# Patient Record
Sex: MALE | NOT HISPANIC OR LATINO
[De-identification: names, ages, dates, MRNs, and addresses within clinical notes are randomized per-mention and may not be internally consistent; named-entity substitution may affect disease eponyms.]

---

## 2022-11-22 ENCOUNTER — APPOINTMENT (OUTPATIENT)
Dept: UROLOGY | Facility: CLINIC | Age: 62
End: 2022-11-22

## 2022-11-22 VITALS
DIASTOLIC BLOOD PRESSURE: 67 MMHG | OXYGEN SATURATION: 96 % | BODY MASS INDEX: 29.82 KG/M2 | HEART RATE: 75 BPM | SYSTOLIC BLOOD PRESSURE: 98 MMHG | HEIGHT: 65 IN | TEMPERATURE: 97.5 F | WEIGHT: 179 LBS

## 2022-11-22 DIAGNOSIS — Z78.9 OTHER SPECIFIED HEALTH STATUS: ICD-10-CM

## 2022-11-22 DIAGNOSIS — Z86.39 PERSONAL HISTORY OF OTHER ENDOCRINE, NUTRITIONAL AND METABOLIC DISEASE: ICD-10-CM

## 2022-11-22 PROBLEM — Z00.00 ENCOUNTER FOR PREVENTIVE HEALTH EXAMINATION: Status: ACTIVE | Noted: 2022-11-22

## 2022-11-22 PROCEDURE — 99203 OFFICE O/P NEW LOW 30 MIN: CPT

## 2022-11-22 RX ORDER — SIMVASTATIN 10 MG/1
10 TABLET, FILM COATED ORAL
Qty: 30 | Refills: 0 | Status: ACTIVE | COMMUNITY
Start: 2022-07-11

## 2022-11-22 RX ORDER — TAMSULOSIN HYDROCHLORIDE 0.4 MG/1
0.4 CAPSULE ORAL
Qty: 30 | Refills: 0 | Status: ACTIVE | COMMUNITY
Start: 2022-09-21

## 2022-11-22 NOTE — PHYSICAL EXAM
[General Appearance - Well Developed] : well developed [General Appearance - Well Nourished] : well nourished [Abdomen Soft] : soft [Abdomen Tenderness] : non-tender [Urethral Meatus] : meatus normal [Penis Abnormality] : normal circumcised penis [Rectal Exam - Rectum] : digital rectal exam was normal [Prostate Enlargement] : the prostate was not enlarged [Prostate Tenderness] : the prostate was not tender

## 2022-11-22 NOTE — ASSESSMENT
[FreeTextEntry1] : Mr. ACEVEDO was evaluated today for gross hematuria hematuria.  \par We discussed that in the large majority of cases, the source of hematuria is unknown but can also include prostatic bleeding, urinary tract stones, our other structural abnormalities. \par UA: small blood today\par \par Outside notes reviewed\par Outside PSA reviewed: 0.7, WNL\par \par Reports IPSS 13 (moderate bother). Mostly satisfied with QOL on tamsulosin\par \par Recommendations\par -CTU (BMP before)\par -UA + micro\par -Urine cytology\par -Cystoscopy \par \par Following this testing, we will formulate a plan moving forward.\par

## 2022-11-22 NOTE — HISTORY OF PRESENT ILLNESS
[FreeTextEntry1] : presents for initial evaluation of hematuria, BPH\par \par PMH significant for: HLD\par PSH significant for: nothing \par Significant meds: tamsulosin\par \par Reports 9 mo h/o painless gross hematuria\par Denies any pain, clots, retention\par Hx of tobacco use: No\par Hx of environmental exposures: No\par Hx of nephrolithiasis: No\par Personal Hx of hematuria previously: No\par Family Hx of  malignancy: No\par \par Denies incontinence \par Daytime Frequency: 4-5\par Nighttime Frequency: 1-2\par Pads per day: 0\par Straining to void: sometimes \par Hesitancy: Yes\par Intermittency: Yes\par Dribbling: Yes\par Subjective Incomplete Emptying: sometimes \par UTIs this past year: No\par \par Daily Fluid Total: 80 oz\par Daily Caffeine Total: 48 oz\par \par Neurologic Hx, Vision or Balance changes: No\par Erections: WNL\par Importance of maintaining ejaculatory ability: Very \par \par PSA (7/11/22): 0.4\par Prostate: 31 g on US ([facility])\par \par IPSS: 13, irritative>obstructive sxs\par QOL: mostly satisfied \par

## 2022-11-23 LAB
ANION GAP SERPL CALC-SCNC: 11 MMOL/L
BUN SERPL-MCNC: 15 MG/DL
CALCIUM SERPL-MCNC: 9.5 MG/DL
CHLORIDE SERPL-SCNC: 102 MMOL/L
CO2 SERPL-SCNC: 23 MMOL/L
CREAT SERPL-MCNC: 0.96 MG/DL
EGFR: 90 ML/MIN/1.73M2
GLUCOSE SERPL-MCNC: 83 MG/DL
POTASSIUM SERPL-SCNC: 4.6 MMOL/L
SODIUM SERPL-SCNC: 137 MMOL/L

## 2022-11-28 LAB
APPEARANCE: CLEAR
BACTERIA: NEGATIVE
BILIRUBIN URINE: NEGATIVE
BLOOD URINE: NEGATIVE
COLOR: NORMAL
GLUCOSE QUALITATIVE U: NEGATIVE
HYALINE CASTS: 1 /LPF
KETONES URINE: NEGATIVE
LEUKOCYTE ESTERASE URINE: NEGATIVE
MICROSCOPIC-UA: NORMAL
NITRITE URINE: NEGATIVE
PH URINE: 5.5
PROTEIN URINE: NEGATIVE
RED BLOOD CELLS URINE: 1 /HPF
SPECIFIC GRAVITY URINE: 1.03
SQUAMOUS EPITHELIAL CELLS: 0 /HPF
URINE CYTOLOGY: NORMAL
UROBILINOGEN URINE: NORMAL
WHITE BLOOD CELLS URINE: 0 /HPF

## 2022-12-13 ENCOUNTER — APPOINTMENT (OUTPATIENT)
Dept: UROLOGY | Facility: CLINIC | Age: 62
End: 2022-12-13

## 2022-12-13 PROCEDURE — 52000 CYSTOURETHROSCOPY: CPT

## 2022-12-13 PROCEDURE — 51741 ELECTRO-UROFLOWMETRY FIRST: CPT

## 2023-01-30 ENCOUNTER — APPOINTMENT (OUTPATIENT)
Dept: UROLOGY | Facility: CLINIC | Age: 63
End: 2023-01-30
Payer: COMMERCIAL

## 2023-01-30 DIAGNOSIS — Z87.438 PERSONAL HISTORY OF OTHER DISEASES OF MALE GENITAL ORGANS: ICD-10-CM

## 2023-01-30 DIAGNOSIS — Z87.448 PERSONAL HISTORY OF OTHER DISEASES OF URINARY SYSTEM: ICD-10-CM

## 2023-01-30 PROCEDURE — 99213 OFFICE O/P EST LOW 20 MIN: CPT

## 2023-01-30 NOTE — PHYSICAL EXAM
[General Appearance - Well Developed] : well developed [General Appearance - Well Nourished] : well nourished [Abdomen Soft] : soft [Abdomen Tenderness] : non-tender

## 2023-01-30 NOTE — ASSESSMENT
[FreeTextEntry1] : Mr. Manzo presents for follow up of pink-tinged urine concerning for gross hematuria. Cytology, cystoscopy, and cross sectional imaging are all negative for any source. A UA with microscopy is also negative for microscopic hematuria. Discussed with the patient that there is no evidence or source for hematuria. Would not need to repeat workup for another 5 years unless presence is documented on UA. \par \par The patient also suffers from moderate lower urinary tract symptoms (IPSS: 13) well controlled on tamsulosin. He is note interested in discussing additional treatment at this time.\par \par Recommendations\par -RTC 1 year

## 2023-01-30 NOTE — HISTORY OF PRESENT ILLNESS
[FreeTextEntry1] : 62M presents for follow up evaluation\par \par h/o gross hematuria\par painless, persistent, 9 mo history\par No clots or retention\par UA (11/22): shows no blood in urine\par Cytology (11/22/22): negative\par Cystoscopy (12/1322):  no source of hematuria. grade I trabeculations. A few small cellules, possible high-riding bladder neck.\par CTU (12/20/22): no source for hematuria, L 2.8 cm Renal cyst, mildly enlarged prostate, thickened bladder wall\par \par LUTS well controlled on tamsulosin\par Denies incontinence \par Daytime Frequency: 4-5\par Nighttime Frequency: 1-2\par Pads per day: 0\par Straining to void: sometimes \par Hesitancy: Yes\par Intermittency: Yes\par Dribbling: Yes\par Subjective Incomplete Emptying: sometimes \par UTIs this past year: No\par \par Daily Fluid Total: 80 oz\par Daily Caffeine Total: 48 oz\par \par Neurologic Hx, Vision or Balance changes: No\par Erections: WNL\par Importance of maintaining ejaculatory ability: Very \par \par PSA (7/11/22): 0.4\par Prostate: 31 g on US ([facility])\par \par IPSS: 13, irritative>obstructive sxs\par QOL: mostly satisfied \par

## 2023-01-30 NOTE — DISCUSSION/SUMMARY
[FreeTextEntry1] : 62M presents for follow up \par \par h/o gross hematuria \par UA: WNL, no blood \par Cytology: WNL \par CTU: pending \par Renal US (11/22/22): negative for upper tract pathology \par \par  Cystoscopy today shows no source of hematuria. grade I trabeculations. A few small cellules, possible high-riding bladder neck.\par \par Uroflow: Insufficient volume 130 cc, Qmax: 9.5 cc/s suggesting VACA\par \par Recommendations\par -RTC after CTU to review results \par